# Patient Record
Sex: MALE | Race: WHITE | Employment: FULL TIME | URBAN - NONMETROPOLITAN AREA
[De-identification: names, ages, dates, MRNs, and addresses within clinical notes are randomized per-mention and may not be internally consistent; named-entity substitution may affect disease eponyms.]

---

## 2023-04-04 ENCOUNTER — ANESTHESIA EVENT (OUTPATIENT)
Dept: OPERATING ROOM | Age: 63
End: 2023-04-04
Payer: COMMERCIAL

## 2023-04-04 RX ORDER — ALPRAZOLAM 2 MG/1
2 TABLET ORAL 4 TIMES DAILY PRN
COMMUNITY

## 2023-04-04 RX ORDER — HYDROCODONE BITARTRATE AND ACETAMINOPHEN 5; 325 MG/1; MG/1
1 TABLET ORAL EVERY 6 HOURS PRN
COMMUNITY

## 2023-04-04 RX ORDER — CARVEDILOL 25 MG/1
25 TABLET ORAL 2 TIMES DAILY WITH MEALS
COMMUNITY

## 2023-04-04 RX ORDER — RIVAROXABAN 10 MG/1
10 TABLET, FILM COATED ORAL DAILY
COMMUNITY

## 2023-04-04 RX ORDER — TADALAFIL 20 MG/1
20 TABLET ORAL PRN
COMMUNITY

## 2023-04-04 RX ORDER — LOSARTAN POTASSIUM 100 MG/1
100 TABLET ORAL DAILY
COMMUNITY

## 2023-04-04 RX ORDER — DILTIAZEM HYDROCHLORIDE 180 MG/1
180 CAPSULE, EXTENDED RELEASE ORAL 2 TIMES DAILY
COMMUNITY

## 2023-04-04 NOTE — ANESTHESIA PRE PROCEDURE
Department of Anesthesiology  Preprocedure Note       Name:  Kemper Crigler   Age:  63 y.o.  :  1960                                          MRN:  [id number]         Date:  2023      Surgeon: Christianne Gifford):  Lenka Negron DPM    Procedure: Procedure(s):  LEFT ACHILLES TENDON LENGTHENING REPAIR SECONDARY  LEFT CALCANEOUS OSTEOTOMY    Medications prior to admission:   Prior to Admission medications    Not on File       Current medications:    No current facility-administered medications for this encounter. No current outpatient medications on file. Allergies:  No Known Allergies    Problem List:  There is no problem list on file for this patient. Past Medical History:  History reviewed. No pertinent past medical history. Past Surgical History:  History reviewed. No pertinent surgical history. Social History:    Social History     Tobacco Use    Smoking status: Not on file    Smokeless tobacco: Not on file   Substance Use Topics    Alcohol use: Not on file                                Counseling given: Not Answered      Vital Signs (Current): There were no vitals filed for this visit. BP Readings from Last 3 Encounters:   No data found for BP       NPO Status:                                                                                 BMI:   Wt Readings from Last 3 Encounters:   No data found for Wt     There is no height or weight on file to calculate BMI.    CBC: No results found for: WBC, RBC, HGB, HCT, MCV, RDW, PLT    CMP: No results found for: NA, K, CL, CO2, BUN, CREATININE, GFRAA, AGRATIO, LABGLOM, GLUCOSE, GLU, PROT, CALCIUM, BILITOT, ALKPHOS, AST, ALT    POC Tests: No results for input(s): POCGLU, POCNA, POCK, POCCL, POCBUN, POCHEMO, POCHCT in the last 72 hours.     Coags: No results found for: PROTIME, INR, APTT    HCG (If Applicable): No results found for: PREGTESTUR, PREGSERUM, HCG, HCGQUANT     ABGs: No results found

## 2023-04-07 ENCOUNTER — HOSPITAL ENCOUNTER (OUTPATIENT)
Dept: GENERAL RADIOLOGY | Age: 63
Discharge: HOME | End: 2023-04-07
Payer: COMMERCIAL

## 2023-04-07 ENCOUNTER — ANESTHESIA (OUTPATIENT)
Dept: OPERATING ROOM | Age: 63
End: 2023-04-07
Payer: COMMERCIAL

## 2023-04-07 ENCOUNTER — HOSPITAL ENCOUNTER (OUTPATIENT)
Age: 63
Setting detail: OUTPATIENT SURGERY
Discharge: HOME | End: 2023-04-07
Attending: PODIATRIST | Admitting: PODIATRIST
Payer: COMMERCIAL

## 2023-04-07 VITALS
DIASTOLIC BLOOD PRESSURE: 89 MMHG | OXYGEN SATURATION: 97 % | WEIGHT: 250 LBS | RESPIRATION RATE: 16 BRPM | HEIGHT: 72 IN | BODY MASS INDEX: 33.86 KG/M2 | TEMPERATURE: 97.6 F | HEART RATE: 69 BPM | SYSTOLIC BLOOD PRESSURE: 140 MMHG

## 2023-04-07 DIAGNOSIS — S86.012A ACHILLES RUPTURE, LEFT, INITIAL ENCOUNTER: Primary | ICD-10-CM

## 2023-04-07 DIAGNOSIS — R52 PAIN: ICD-10-CM

## 2023-04-07 PROCEDURE — 73630 X-RAY EXAM OF FOOT: CPT

## 2023-04-07 PROCEDURE — 2580000003 HC RX 258: Performed by: PODIATRIST

## 2023-04-07 PROCEDURE — 2500000003 HC RX 250 WO HCPCS: Performed by: NURSE ANESTHETIST, CERTIFIED REGISTERED

## 2023-04-07 PROCEDURE — 2500000003 HC RX 250 WO HCPCS: Performed by: PODIATRIST

## 2023-04-07 PROCEDURE — 6360000002 HC RX W HCPCS: Performed by: NURSE ANESTHETIST, CERTIFIED REGISTERED

## 2023-04-07 PROCEDURE — 6360000002 HC RX W HCPCS: Performed by: PODIATRIST

## 2023-04-07 PROCEDURE — 6370000000 HC RX 637 (ALT 250 FOR IP): Performed by: PODIATRIST

## 2023-04-07 RX ORDER — DIPHENHYDRAMINE HYDROCHLORIDE 50 MG/ML
12.5 INJECTION INTRAMUSCULAR; INTRAVENOUS
Status: DISCONTINUED | OUTPATIENT
Start: 2023-04-07 | End: 2023-04-07 | Stop reason: HOSPADM

## 2023-04-07 RX ORDER — PHENYLEPHRINE HCL IN 0.9% NACL 1 MG/10 ML
SYRINGE (ML) INTRAVENOUS PRN
Status: DISCONTINUED | OUTPATIENT
Start: 2023-04-07 | End: 2023-04-07 | Stop reason: SDUPTHER

## 2023-04-07 RX ORDER — DEXAMETHASONE SODIUM PHOSPHATE 4 MG/ML
INJECTION, SOLUTION INTRA-ARTICULAR; INTRALESIONAL; INTRAMUSCULAR; INTRAVENOUS; SOFT TISSUE PRN
Status: DISCONTINUED | OUTPATIENT
Start: 2023-04-07 | End: 2023-04-07 | Stop reason: SDUPTHER

## 2023-04-07 RX ORDER — SODIUM CHLORIDE 9 MG/ML
INJECTION, SOLUTION INTRAVENOUS PRN
Status: DISCONTINUED | OUTPATIENT
Start: 2023-04-07 | End: 2023-04-07 | Stop reason: HOSPADM

## 2023-04-07 RX ORDER — ONDANSETRON 2 MG/ML
4 INJECTION INTRAMUSCULAR; INTRAVENOUS
Status: DISCONTINUED | OUTPATIENT
Start: 2023-04-07 | End: 2023-04-07 | Stop reason: HOSPADM

## 2023-04-07 RX ORDER — ROCURONIUM BROMIDE 10 MG/ML
INJECTION, SOLUTION INTRAVENOUS PRN
Status: DISCONTINUED | OUTPATIENT
Start: 2023-04-07 | End: 2023-04-07 | Stop reason: SDUPTHER

## 2023-04-07 RX ORDER — HYDRALAZINE HYDROCHLORIDE 20 MG/ML
10 INJECTION INTRAMUSCULAR; INTRAVENOUS
Status: DISCONTINUED | OUTPATIENT
Start: 2023-04-07 | End: 2023-04-07 | Stop reason: HOSPADM

## 2023-04-07 RX ORDER — OXYCODONE HYDROCHLORIDE AND ACETAMINOPHEN 5; 325 MG/1; MG/1
1 TABLET ORAL EVERY 6 HOURS PRN
Status: DISCONTINUED | OUTPATIENT
Start: 2023-04-07 | End: 2023-04-07 | Stop reason: HOSPADM

## 2023-04-07 RX ORDER — SODIUM CHLORIDE 0.9 % (FLUSH) 0.9 %
5-40 SYRINGE (ML) INJECTION EVERY 12 HOURS SCHEDULED
Status: DISCONTINUED | OUTPATIENT
Start: 2023-04-07 | End: 2023-04-07 | Stop reason: HOSPADM

## 2023-04-07 RX ORDER — DEXAMETHASONE SODIUM PHOSPHATE 4 MG/ML
4 INJECTION, SOLUTION INTRA-ARTICULAR; INTRALESIONAL; INTRAMUSCULAR; INTRAVENOUS; SOFT TISSUE
Status: DISCONTINUED | OUTPATIENT
Start: 2023-04-07 | End: 2023-04-07 | Stop reason: HOSPADM

## 2023-04-07 RX ORDER — LABETALOL HYDROCHLORIDE 5 MG/ML
10 INJECTION, SOLUTION INTRAVENOUS
Status: DISCONTINUED | OUTPATIENT
Start: 2023-04-07 | End: 2023-04-07 | Stop reason: HOSPADM

## 2023-04-07 RX ORDER — FENTANYL CITRATE 50 UG/ML
25 INJECTION, SOLUTION INTRAMUSCULAR; INTRAVENOUS EVERY 5 MIN PRN
Status: DISCONTINUED | OUTPATIENT
Start: 2023-04-07 | End: 2023-04-07 | Stop reason: HOSPADM

## 2023-04-07 RX ORDER — SODIUM CHLORIDE 0.9 % (FLUSH) 0.9 %
5-40 SYRINGE (ML) INJECTION PRN
Status: DISCONTINUED | OUTPATIENT
Start: 2023-04-07 | End: 2023-04-07 | Stop reason: HOSPADM

## 2023-04-07 RX ORDER — ONDANSETRON 2 MG/ML
INJECTION INTRAMUSCULAR; INTRAVENOUS PRN
Status: DISCONTINUED | OUTPATIENT
Start: 2023-04-07 | End: 2023-04-07 | Stop reason: SDUPTHER

## 2023-04-07 RX ORDER — KETOROLAC TROMETHAMINE 30 MG/ML
INJECTION, SOLUTION INTRAMUSCULAR; INTRAVENOUS PRN
Status: DISCONTINUED | OUTPATIENT
Start: 2023-04-07 | End: 2023-04-07 | Stop reason: SDUPTHER

## 2023-04-07 RX ORDER — LIDOCAINE HYDROCHLORIDE 20 MG/ML
INJECTION, SOLUTION INFILTRATION; PERINEURAL PRN
Status: DISCONTINUED | OUTPATIENT
Start: 2023-04-07 | End: 2023-04-07 | Stop reason: SDUPTHER

## 2023-04-07 RX ORDER — MEPERIDINE HYDROCHLORIDE 25 MG/ML
12.5 INJECTION INTRAMUSCULAR; INTRAVENOUS; SUBCUTANEOUS EVERY 5 MIN PRN
Status: DISCONTINUED | OUTPATIENT
Start: 2023-04-07 | End: 2023-04-07 | Stop reason: HOSPADM

## 2023-04-07 RX ORDER — BUPIVACAINE HYDROCHLORIDE 5 MG/ML
INJECTION, SOLUTION EPIDURAL; INTRACAUDAL
Status: COMPLETED | OUTPATIENT
Start: 2023-04-07 | End: 2023-04-07

## 2023-04-07 RX ORDER — PROPOFOL 10 MG/ML
INJECTION, EMULSION INTRAVENOUS PRN
Status: DISCONTINUED | OUTPATIENT
Start: 2023-04-07 | End: 2023-04-07 | Stop reason: SDUPTHER

## 2023-04-07 RX ORDER — FENTANYL CITRATE 50 UG/ML
INJECTION, SOLUTION INTRAMUSCULAR; INTRAVENOUS PRN
Status: DISCONTINUED | OUTPATIENT
Start: 2023-04-07 | End: 2023-04-07 | Stop reason: SDUPTHER

## 2023-04-07 RX ORDER — SODIUM CHLORIDE, SODIUM LACTATE, POTASSIUM CHLORIDE, CALCIUM CHLORIDE 600; 310; 30; 20 MG/100ML; MG/100ML; MG/100ML; MG/100ML
INJECTION, SOLUTION INTRAVENOUS CONTINUOUS
Status: DISCONTINUED | OUTPATIENT
Start: 2023-04-07 | End: 2023-04-07 | Stop reason: HOSPADM

## 2023-04-07 RX ADMIN — CEFAZOLIN 2000 MG: 2 INJECTION, POWDER, FOR SOLUTION INTRAMUSCULAR; INTRAVENOUS at 07:48

## 2023-04-07 RX ADMIN — Medication 100 MCG: at 08:27

## 2023-04-07 RX ADMIN — DEXAMETHASONE SODIUM PHOSPHATE 4 MG: 4 INJECTION, SOLUTION INTRAMUSCULAR; INTRAVENOUS at 07:51

## 2023-04-07 RX ADMIN — ROCURONIUM BROMIDE 70 MG: 10 INJECTION INTRAVENOUS at 07:40

## 2023-04-07 RX ADMIN — SODIUM CHLORIDE, POTASSIUM CHLORIDE, SODIUM LACTATE AND CALCIUM CHLORIDE: 600; 310; 30; 20 INJECTION, SOLUTION INTRAVENOUS at 06:51

## 2023-04-07 RX ADMIN — SUGAMMADEX 200 MG: 100 INJECTION, SOLUTION INTRAVENOUS at 09:12

## 2023-04-07 RX ADMIN — FENTANYL CITRATE 25 MCG: 50 INJECTION, SOLUTION INTRAMUSCULAR; INTRAVENOUS at 09:44

## 2023-04-07 RX ADMIN — OXYCODONE AND ACETAMINOPHEN 1 TABLET: 5; 325 TABLET ORAL at 10:08

## 2023-04-07 RX ADMIN — LIDOCAINE HYDROCHLORIDE 100 MG: 20 INJECTION, SOLUTION INFILTRATION; PERINEURAL at 07:39

## 2023-04-07 RX ADMIN — ONDANSETRON 4 MG: 2 INJECTION INTRAMUSCULAR; INTRAVENOUS at 07:54

## 2023-04-07 RX ADMIN — FENTANYL CITRATE 100 MCG: 50 INJECTION INTRAMUSCULAR; INTRAVENOUS at 07:39

## 2023-04-07 RX ADMIN — PROPOFOL 200 MG: 10 INJECTION, EMULSION INTRAVENOUS at 07:39

## 2023-04-07 RX ADMIN — KETOROLAC TROMETHAMINE 30 MG: 30 INJECTION, SOLUTION INTRAMUSCULAR at 08:58

## 2023-04-07 ASSESSMENT — PAIN DESCRIPTION - LOCATION
LOCATION: FOOT

## 2023-04-07 ASSESSMENT — PAIN DESCRIPTION - DESCRIPTORS: DESCRIPTORS: SORE

## 2023-04-07 ASSESSMENT — PAIN DESCRIPTION - ORIENTATION
ORIENTATION: LEFT

## 2023-04-07 ASSESSMENT — PAIN DESCRIPTION - PAIN TYPE
TYPE: SURGICAL PAIN

## 2023-04-07 ASSESSMENT — PAIN - FUNCTIONAL ASSESSMENT
PAIN_FUNCTIONAL_ASSESSMENT: PREVENTS OR INTERFERES SOME ACTIVE ACTIVITIES AND ADLS
PAIN_FUNCTIONAL_ASSESSMENT: 0-10

## 2023-04-07 ASSESSMENT — PAIN DESCRIPTION - FREQUENCY: FREQUENCY: CONTINUOUS

## 2023-04-07 ASSESSMENT — PAIN SCALES - GENERAL
PAINLEVEL_OUTOF10: 8
PAINLEVEL_OUTOF10: 6
PAINLEVEL_OUTOF10: 8
PAINLEVEL_OUTOF10: 8

## 2023-04-07 ASSESSMENT — LIFESTYLE VARIABLES: SMOKING_STATUS: 0

## 2023-04-07 ASSESSMENT — PAIN DESCRIPTION - ONSET: ONSET: PROGRESSIVE

## 2023-04-07 NOTE — H&P
..Update History & Physical    The patient's History and Physical of March 22, 2023 was reviewed with the patient and I examined the patient. There was no change. The surgical site was confirmed by the patient and me. Plan: The risks, benefits, expected outcome, and alternative to the recommended procedure have been discussed with the patient. Patient understands and wants to proceed with the procedure.      Electronically signed by Adalberto Almanzar DPM on 4/7/2023 at 7:12 AM

## 2023-04-07 NOTE — H&P
..The patient was counseled at length about the risks of charli Covid-19 during their perioperative period and any recovery window from their procedure. The patient was made aware that charli Covid-19  may worsen their prognosis for recovering from their procedure  and lend to a higher morbidity and/or mortality risk. All material risks, benefits, and reasonable alternatives including postponing the procedure were discussed. The patient does wish to proceed with the procedure at this time.

## 2023-04-07 NOTE — ANESTHESIA POSTPROCEDURE EVALUATION
Department of Anesthesiology  Postprocedure Note    Patient: Angel Gupta  MRN: [id number]  YOB: 1960  Date of evaluation: 4/7/2023      Procedure Summary     Date: 04/07/23 Room / Location: [address] [place] [address] / [facility]    Anesthesia Start: 7966 Anesthesia Stop: 0921    Procedures:       LEFT ACHILLES TENDON LENGTHENING REPAIR SECONDARY (Left: Ankle)      LEFT CALCANEOUS OSTEOTOMY (Left: Ankle) Diagnosis:       Tendonitis, Achilles, left      Calcaneal spur of left foot      Short Achilles tendon, left      (Tendonitis, Achilles, left [M76.62])      (Calcaneal spur of left foot [M77.32])      (Short Achilles tendon, left [M67.02])    Surgeons: Fabiola Boyle DPM Responsible Provider: Roetta Favre, APRN - CRNA    Anesthesia Type: General ASA Status: 3          Anesthesia Type: General    Davion Phase I:      Davion Phase II:        Anesthesia Post Evaluation    Patient location during evaluation: bedside  Patient participation: complete - patient participated  Level of consciousness: sleepy but conscious  Pain score: 0  Airway patency: patent  Nausea & Vomiting: no nausea and no vomiting  Complications: no  Cardiovascular status: blood pressure returned to baseline and hemodynamically stable  Respiratory status: acceptable  Hydration status: stable

## 2023-04-07 NOTE — DISCHARGE INSTRUCTIONS
[facility] in [address] (Same Day Surgery)    Do not drive, work around [address] or use equipment. Do not drink any alcoholic beverages. Do not smoke while alone. Avoid making important decisions. Plan to spend a quiet, relaxed evening @ home. Resume normal activities as you begin to feel better. Eat lightly for your first meal, then gradually increase your diet to what is normal for you. In case of nausea, avoid food and drink only clear liquids. Resume food as nausea ceases. Notify your surgeon if you experience fever, chills, large amount of bleeding, difficulty breathing, persistent nausea and vomiting or any other disturbing problem. Call for a follow-up appointment with your surgeon. *Please follow instructions given to you by Dr. Matilde Lindsay*- see attached.

## 2023-04-07 NOTE — BRIEF OP NOTE
Brief Postoperative Note      Patient: Royer Olson  YOB: 1960  MRN: [id number]    Date of Procedure: 4/7/2023    Pre-Op Diagnosis: Tendonitis, Achilles, left [M76.62]  Calcaneal spur of left foot [M77.32]  Short Achilles tendon, left [M67.02]    Post-Op Diagnosis: Same       Procedure(s):  LEFT ACHILLES TENDON LENGTHENING REPAIR SECONDARY  LEFT CALCANEOUS OSTEOTOMY    Surgeon(s):  Nicki Pulido DPM    Assistant:  * No surgical staff found *    Anesthesia: General    Estimated Blood Loss (mL): Minimal    Complications: None    Specimens:   * No specimens in log *    Implants:  Implant Name Type Inv.  Item Serial No.  Lot No. LRB No. Used Action   ANCHOR SUT 3.5MM W/ NDL TI TWINFIX - [id number]  ANCHOR SUT 3.5MM W/ NDL TI TWINFIX  [employer]- [id number] Left 2 Implanted   ANCHOR SUT [facility]4.[facility]MM [facility] TISS FIX FOOTPRINT - [id number]  ANCHOR SUT DIA4.5MM TAP IN THE [facility] FOR SFT TISS FIX FOOTPRINT  [employer]- [id number] Left 1 Implanted   ANCHOR SUT DIA4.5MM TAP IN THE FRIARY OF [facility] FOR SFT TISS FIX FOOTPRINT - [id number]  ANCHOR SUT DIA4.5MM TAP IN THE FRIARY [facility] FOR SFT TISS FIX FOOTPRINT  [employer]- [id number] Left 1 Implanted   GRAFT HUM TISS 1CC FLOWABLE PLCNTA TISS MTRX VIAFLOW - [id number]  GRAFT HUM TISS 1CC FLOWABLE PLCNTA TISS MTRX VIAFLOW  3[employer] [facility]  Left 1 Implanted   GRAFT HUM TISS CHORION BASE THCK 4X2 CM AMNIO ACTISHIELD - [id number]  GRAFT HUM TISS CHORION BASE [address] 4X2 CM AMNIO ACTISHIELD  [employer] [facility]  Left 1 Implanted         Drains: * No LDAs found *    Findings: as expected    Electronically signed by Nicki Pulido DPM on 4/7/2023 at 9:10 AM

## 2023-04-07 NOTE — PROGRESS NOTES
0956-Pt returned to \[facility]\"" from PACU. Report received from [facility]. 1008-Pt c/o pain rating 8 out of 10. Patient given percocet pain pill. Pt drinking fluids. Tolerating well. Wife at bedside. 1028-Discharge instructions reviewed with patient and patient's wife. Verbalized understanding. 1036-IV removed. Pt tolerated well. 1045-Patient discharged home with wife. Patient stable at discharge.
7648- pt received from OR. Monitors placed and alarms on. Report received from [facility] and [address].  9328- pt resting comfortably. Denies any pain or nausea. Tolerating ice chips well. 3946- pt medicated for complaints of pain. 6069- pt transported to [facility] by RN and report given to [facility].
home after your surgery and be able to check on you every couple                  hours for the day. You will not be allowed to leave alone or drive yourself home. It is strongly suggested someone stay with you the first 24                  hrs. Your surgery will be cancelled if you do not have a ride home. Please call [phone] with any questions.

## 2023-04-08 NOTE — OP NOTE
[place] [address] [facility]                                OPERATIVE REPORT    PATIENT NAME: Odalis Thompson                      :        1960  MED REC NO:   [id number]                          ROOM:  ACCOUNT NO:   [de-identified]                           ADMIT DATE: 2023  PROVIDER:     Sachi Rojas DPM    DATE OF PROCEDURE:  2023    ATTENDING PROVIDER:  Sachi Rojas DPM    PREOPERATIVE DIAGNOSES:  1. Achilles tendonitis, M76.62.  2.  Calcaneal bone spur, M77.32 and M77.02, short Achilles tendon on the  left. POSTOPERATIVE DIAGNOSES:  1. Achilles tendonitis, M76.62.  2.  Calcaneal bone spur, M77.32 and M77.02, short Achilles tendon on the  left. OPERATIONS PERFORMED:  Achilles tendon secondary repair [id number] and  ostectomy of calcaneus 35471. ANESTHESIA:  General.    HEMOSTASIS:  Pneumatic thigh tourniquet set at 350 mmHg. ESTIMATED BLOOD LOSS:  Minimal.    MATERIALS:  Two [employer] 3.5 mm TWINFIX x2 anchors and 4.5 mm  footprint anchors x2. Also left amniotic ViaFlow 1 mL injectable and a  2 cm x 3 cm ActiShield amniotic sheath as well. COMPLICATIONS:  None. CONDITION:  Stable. INDICATIONS FOR THE PROCEDURE:  The patient is a very pleasant  68-year-old gentleman that has been seen in the office for quite  sometime with a chief complaint of ongoing pain associated to the left  posterior Achilles insertion site. The patient has tried and failed  conservative therapy and we had given him option of living with the  condition, doing further conservative therapy, or surgical intervention. I went over all risks, benefits, and complications of the procedure in  detail. The patient at this point, understands that there are no  guarantees and wishes to proceed. The patient understands the added  risk of COVID.   The patient understands that it is going to be an  initial 12-week recovery and